# Patient Record
Sex: FEMALE | Race: WHITE
[De-identification: names, ages, dates, MRNs, and addresses within clinical notes are randomized per-mention and may not be internally consistent; named-entity substitution may affect disease eponyms.]

---

## 2017-05-11 ENCOUNTER — HOSPITAL ENCOUNTER (OUTPATIENT)
Dept: HOSPITAL 47 - RADMAMWWP | Age: 47
Discharge: HOME | End: 2017-05-11
Payer: COMMERCIAL

## 2017-05-11 DIAGNOSIS — Z12.31: Primary | ICD-10-CM

## 2017-05-12 NOTE — MM
Reason for exam: screening  (asymptomatic).

Last mammogram was performed 3 years and 2 months ago.



History:

Retro-pectoral saline implants in both breasts, 2004.



Physical Findings:

A clinical breast exam by your physician is recommended on an annual basis and 

results should be correlated with mammographic findings.



MG Screening Mammo Implant/CAD

Bilateral CC, MLO, and ID view(s) were taken.

Prior study comparison: February 26, 2014, CAD bilateral diagnostic mammogram.  

May 30, 2012, CAD bilateral diagnostic mammogram.

There are scattered fibroglandular densities.  Asymmetric breast tissue in the 

left breast.  No significant changes when compared with prior studies.





ASSESSMENT: Benign, BI-RAD 2



RECOMMENDATION:

Follow-up diagnostic mammogram of both breasts in 1 year.

## 2018-07-20 ENCOUNTER — HOSPITAL ENCOUNTER (OUTPATIENT)
Dept: HOSPITAL 47 - RADMAMWWP | Age: 48
Discharge: HOME | End: 2018-07-20
Attending: OBSTETRICS & GYNECOLOGY
Payer: COMMERCIAL

## 2018-07-20 DIAGNOSIS — Z12.31: Primary | ICD-10-CM

## 2018-07-20 PROCEDURE — 77067 SCR MAMMO BI INCL CAD: CPT

## 2018-07-23 NOTE — MM
Reason for exam: screening  (asymptomatic).

Last mammogram was performed 1 year and 2 months ago.



History:

Retro-pectoral saline implants in both breasts, 2004.



Physical Findings:

A clinical breast exam by your physician is recommended on an annual basis and 

results should be correlated with mammographic findings.



MG Screening Mammo Implant/CAD

Bilateral CC, MLO, and ID view(s) were taken.

Prior study comparison: May 11, 2017, bilateral MG screening mammo implant/CAD.  

February 26, 2014, CAD bilateral diagnostic mammogram.

There are scattered fibroglandular densities.  Bilateral retropectoral saline 

implants.  No significant changes when compared with prior studies.





ASSESSMENT: Negative, BI-RAD 1



RECOMMENDATION:

Routine screening mammogram of both breasts in 1 year.

## 2021-03-18 ENCOUNTER — HOSPITAL ENCOUNTER (OUTPATIENT)
Dept: HOSPITAL 47 - RADMAMWWP | Age: 51
End: 2021-03-18
Attending: OBSTETRICS & GYNECOLOGY
Payer: COMMERCIAL

## 2021-03-18 DIAGNOSIS — Z12.31: Primary | ICD-10-CM

## 2021-03-18 DIAGNOSIS — Z78.0: ICD-10-CM

## 2021-03-18 PROCEDURE — 77067 SCR MAMMO BI INCL CAD: CPT

## 2021-03-22 NOTE — MM
Reason for exam: screening  (asymptomatic).

Last mammogram was performed 2 years and 8 months ago.



History:

Patient is postmenopausal.

Retro-pectoral saline implants in both breasts, 2004.



Physical Findings:

A clinical breast exam by your physician is recommended on an annual basis and 

results should be correlated with mammographic findings.



MG Screening Mammo Implant/CAD

Bilateral CC, MLO, and ID view(s) were taken.

Prior study comparison: July 20, 2018, bilateral MG screening mammo implant/CAD.  

May 11, 2017, bilateral MG screening mammo implant/CAD.

There are scattered fibroglandular densities.  Focal asymmetry left breast. 

Bilateral breast prothesis.  No significant changes when compared with prior 

studies.





ASSESSMENT: Benign, BI-RAD 2



RECOMMENDATION:

Routine screening mammogram of both breasts in 1 year.

## 2023-05-04 ENCOUNTER — HOSPITAL ENCOUNTER (OUTPATIENT)
Dept: HOSPITAL 47 - RADBDWWP | Age: 53
Discharge: HOME | End: 2023-05-04
Attending: INTERNAL MEDICINE
Payer: COMMERCIAL

## 2023-05-04 DIAGNOSIS — M85.851: Primary | ICD-10-CM

## 2023-05-04 PROCEDURE — 77080 DXA BONE DENSITY AXIAL: CPT

## 2023-05-04 NOTE — BD
EXAMINATION TYPE: Axial Bone Density

 

DATE OF EXAM: 2023

 

CLINICAL HISTORY: 52 years old Female.  ICD-10 CODE: M85.851 OT DISRD OF BONE DENSITY AND STRUCTURE

 

Height:  64

Weight:  153

 

FRAX RISK QUESTIONS:

 

Family History (Parent hip fracture):  no

History of Fracture in Adulthood: no

Secondary Osteoporosis: no

 

RISK FACTORS 

HISTORY OF: 

Family History of Osteoporosis: no

Active: yes

Diet low in dairy products/other sources of calcium:  yes

Postmenopausal woman: yes,  age 47

Take estrogen and/or progesterone medications: yes

How lon months

Lost more than 2 inches in height since high school: no

Frequent falls: no

Poor Health: no

 

MEDICATIONS: 

Thyroid Medications:  yes

Which medication: Levothyroxine 

How Lon+ years

Additional Medications: no 

 

 

EXAM MEASUREMENTS: 

Bone mineral densitometry was performed using the [employer].

Bone mineral density as measured about the Lumbar spine is:  

----- L1-L4(G/cm2): 1.307

T Score Values are as follows:

----- L1: 0.9

----- L2: 1.7

----- L3: 1.1

----- L4: 0.4

----- L1-L4: 1.1

 

Z Score Values are as follows:

----- L1: 1.3

----- L2: 2.1

----- L3: 1.5

----- L4: 0.8

----- L1-L4: 1.4

 

Bone mineral density baseline

 

Bone mineral density about the R hip (g/cm2): 0.994

Bone mineral density about the L hip (g/cm2): 1.039

T Score values are as follows:

-----R Neck: -1.2

-----L Neck: -0.8

-----R Total: -0.1

-----L Total: 0.3

 

Z Score values are as follows:

-----R Neck: -0.5

-----L Neck: 0.0

-----R Total: 0.3

-----L Total: 0.7

 

Bone mineral density baseline

 

 

FRAX%s: The graph provided illustrates a 4.9% chance for a major osteoporotic fx and a 0.3% chance fo
r the hips probability for fx in 10 years time.

 

 

 

 

IMPRESSION:

Osteopenia (T Score between -2.5 and -1) femoral neck level right hip.

 

There is slightly increased risk of fracture and the patient may be considered 

for treatment. 

 

Re-Screen 2-5 years.

 

NOTE:  T-SCORE=SD OF THE YOUNG ADULT MEAN.